# Patient Record
Sex: FEMALE | Race: WHITE | NOT HISPANIC OR LATINO | URBAN - METROPOLITAN AREA
[De-identification: names, ages, dates, MRNs, and addresses within clinical notes are randomized per-mention and may not be internally consistent; named-entity substitution may affect disease eponyms.]

---

## 2019-09-18 ENCOUNTER — OFFICE (OUTPATIENT)
Dept: URBAN - METROPOLITAN AREA CLINIC 101 | Facility: CLINIC | Age: 69
End: 2019-09-18
Payer: COMMERCIAL

## 2019-09-18 VITALS
SYSTOLIC BLOOD PRESSURE: 136 MMHG | HEIGHT: 64 IN | DIASTOLIC BLOOD PRESSURE: 91 MMHG | HEART RATE: 81 BPM | TEMPERATURE: 97.7 F | WEIGHT: 149 LBS

## 2019-09-18 DIAGNOSIS — Z12.11 ENCOUNTER FOR SCREENING FOR MALIGNANT NEOPLASM OF COLON: ICD-10-CM

## 2019-09-18 DIAGNOSIS — K59.00 CONSTIPATION, UNSPECIFIED: ICD-10-CM

## 2019-09-18 PROCEDURE — 99203 OFFICE O/P NEW LOW 30 MIN: CPT

## 2019-09-18 NOTE — SERVICEHPINOTES
BENTON CALVO   is a   69   year old    female who is being seen in consultation at the request of   HUY LEIVA   for OV prior to colonoscopy. She states awhile ago she had the flu and ever since she has had issues with constipation. Also having abdominal cramping (diffuse). Reports bloating after eating. Started taking 1 colace AM and PM and then doubled this and 2 sennakot at night. She then started taking laxatives (dulcolax) which seems to be the only thing that works for her. Bloating/abd pain improves after BM. She has never had a colonoscopy as she has previously refused. Prior to this, BMs were regular every other day or so. No family hx of colon cancer, polyps, or IBD. Denies rectal bleeding. BSS type 1-2.She had a CT scan on 8/15 which revealed distended, partially displaced cecum with no evidence of cecal volvulus along with small bowel intussusception in LLQ with no signs of obstruction/complication.BRDenies weight loss, heartburn, n/v, dysphagia. No cardiac or pulmonary issues.BR

## 2019-09-23 ENCOUNTER — ON CAMPUS - OUTPATIENT (OUTPATIENT)
Dept: URBAN - METROPOLITAN AREA HOSPITAL 35 | Facility: HOSPITAL | Age: 69
End: 2019-09-23
Payer: COMMERCIAL

## 2019-09-23 DIAGNOSIS — K59.09 OTHER CONSTIPATION: ICD-10-CM

## 2019-09-23 DIAGNOSIS — D12.7 BENIGN NEOPLASM OF RECTOSIGMOID JUNCTION: ICD-10-CM

## 2019-09-23 DIAGNOSIS — R93.3 ABNORMAL FINDINGS ON DIAGNOSTIC IMAGING OF OTHER PARTS OF DI: ICD-10-CM

## 2019-09-23 PROCEDURE — 45380 COLONOSCOPY AND BIOPSY: CPT

## 2021-08-16 ENCOUNTER — PREPPED CHART (OUTPATIENT)
Dept: URBAN - METROPOLITAN AREA CLINIC 66 | Facility: CLINIC | Age: 71
End: 2021-08-16

## 2021-08-16 PROBLEM — H35.361 MACULAR DRUSEN: Noted: 2021-08-16

## 2021-08-16 PROBLEM — H43.812 POSTERIOR VITREOUS DETACHMENT: Noted: 2021-08-16

## 2021-08-16 PROBLEM — Z96.1 PSEUDOPHAKIA: Noted: 2021-08-16

## 2021-08-16 PROBLEM — H43.812 POSTERIOR VITREOUS DETACHMENT: Status: STABILIZING | Noted: 2021-08-16 | Resolved: 2021-08-16

## 2021-08-16 PROBLEM — H35.361 MACULAR DRUSEN: Status: STABILIZING | Noted: 2021-08-16 | Resolved: 2021-08-16

## 2021-08-16 PROBLEM — Z96.1 PSEUDOPHAKIA: Noted: 2021-08-16 | Resolved: 2021-08-16

## 2021-08-16 PROBLEM — H35.341 MACULAR HOLE: Status: RESOLVED | Noted: 2021-08-16 | Resolved: 2021-08-16

## 2021-08-16 PROBLEM — H26.8 OTHER CATARACT: Noted: 2021-08-16

## 2021-08-16 PROBLEM — H35.341 MACULAR HOLE: Noted: 2021-08-16

## 2022-07-19 ASSESSMENT — TONOMETRY
OD_IOP_MMHG: 16
OS_IOP_MMHG: 18

## 2022-07-19 ASSESSMENT — VISUAL ACUITY
OD_CC: 20/25-1
OS_CC: 20/20

## 2022-08-29 ENCOUNTER — FOLLOW UP (OUTPATIENT)
Dept: URBAN - METROPOLITAN AREA CLINIC 66 | Facility: CLINIC | Age: 72
End: 2022-08-29

## 2022-08-29 DIAGNOSIS — H35.361: ICD-10-CM

## 2022-08-29 DIAGNOSIS — H43.812: ICD-10-CM

## 2022-08-29 DIAGNOSIS — H35.341: ICD-10-CM

## 2022-08-29 PROCEDURE — 92014 COMPRE OPH EXAM EST PT 1/>: CPT

## 2022-08-29 PROCEDURE — 92134 CPTRZ OPH DX IMG PST SGM RTA: CPT

## 2022-08-29 ASSESSMENT — TONOMETRY
OD_IOP_MMHG: 16
OS_IOP_MMHG: 21

## 2022-08-29 ASSESSMENT — VISUAL ACUITY
OD_CC: 20/25-1
OS_CC: 20/20-1

## 2024-08-29 ENCOUNTER — FOLLOW UP (OUTPATIENT)
Dept: URBAN - METROPOLITAN AREA CLINIC 66 | Facility: CLINIC | Age: 74
End: 2024-08-29

## 2024-08-29 DIAGNOSIS — H43.812: ICD-10-CM

## 2024-08-29 DIAGNOSIS — H35.361: ICD-10-CM

## 2024-08-29 DIAGNOSIS — H35.341: ICD-10-CM

## 2024-08-29 PROCEDURE — 92134 CPTRZ OPH DX IMG PST SGM RTA: CPT

## 2024-08-29 PROCEDURE — 92202 OPSCPY EXTND ON/MAC DRAW: CPT

## 2024-08-29 PROCEDURE — 92014 COMPRE OPH EXAM EST PT 1/>: CPT

## 2024-08-29 ASSESSMENT — VISUAL ACUITY
OD_CC: 20/25-2
OS_CC: 20/25+2

## 2024-08-29 ASSESSMENT — TONOMETRY
OD_IOP_MMHG: 19
OS_IOP_MMHG: 16